# Patient Record
Sex: MALE
[De-identification: names, ages, dates, MRNs, and addresses within clinical notes are randomized per-mention and may not be internally consistent; named-entity substitution may affect disease eponyms.]

---

## 2022-07-01 PROBLEM — Z00.00 ENCOUNTER FOR PREVENTIVE HEALTH EXAMINATION: Status: ACTIVE | Noted: 2022-07-01

## 2022-07-05 ENCOUNTER — APPOINTMENT (OUTPATIENT)
Dept: SURGERY | Facility: CLINIC | Age: 72
End: 2022-07-05

## 2022-07-05 VITALS
SYSTOLIC BLOOD PRESSURE: 130 MMHG | HEIGHT: 63 IN | OXYGEN SATURATION: 98 % | RESPIRATION RATE: 16 BRPM | DIASTOLIC BLOOD PRESSURE: 85 MMHG | HEART RATE: 76 BPM

## 2022-07-05 DIAGNOSIS — R22.41 LOCALIZED SWELLING, MASS AND LUMP, RIGHT LOWER LIMB: ICD-10-CM

## 2022-07-05 PROCEDURE — 99203 OFFICE O/P NEW LOW 30 MIN: CPT

## 2022-07-05 NOTE — PLAN
[FreeTextEntry1] : \par - I discussed the findings with the patient, and at this time, given the concern for an underlying mass, will obtain CT of the right gluteal region to better characterize the mass\par - Patient to follow with me after imaging obtained\par - Patient was agreeable to this plan and all his questions were answered

## 2022-07-05 NOTE — PHYSICAL EXAM
[Respiratory Effort] : normal respiratory effort [Normal Rate and Rhythm] : normal rate and rhythm [Alert] : alert [Oriented to Person] : oriented to person [Oriented to Place] : oriented to place [Oriented to Time] : oriented to time [Calm] : calm [de-identified] : NAD [de-identified] : Some blanching of the skin over the right gluteal region at the site of the mass [de-identified] : Approximately 10 cm firm mass emanating deep to the skin, non-mobile, non-TTP

## 2022-07-05 NOTE — HISTORY OF PRESENT ILLNESS
[de-identified] : Patient is a 72 year old male with HLD, who presents for mass of the right buttock x 15 years. States that he first noticed a mass in the right buttock approximately 15 years ago for which he underwent I&D with apparent resolution at that time. However in the past few months has noticed increased swelling of the region, characterized as a firm mass. Denies any changes to appetite, is able to tolerate diet well, having normal bowel function. Denies smoking/EtOH/drug use. NKDA

## 2022-07-05 NOTE — ASSESSMENT
[FreeTextEntry1] : 72 year old male with large mass of the right buttock, possible differentials including lipoma or sarcoma, less likely abscess given presentation